# Patient Record
Sex: MALE | Race: BLACK OR AFRICAN AMERICAN | NOT HISPANIC OR LATINO
[De-identification: names, ages, dates, MRNs, and addresses within clinical notes are randomized per-mention and may not be internally consistent; named-entity substitution may affect disease eponyms.]

---

## 2022-10-12 ENCOUNTER — APPOINTMENT (OUTPATIENT)
Dept: SURGERY | Facility: CLINIC | Age: 52
End: 2022-10-12

## 2022-10-12 VITALS
DIASTOLIC BLOOD PRESSURE: 78 MMHG | SYSTOLIC BLOOD PRESSURE: 123 MMHG | HEIGHT: 71 IN | HEART RATE: 84 BPM | BODY MASS INDEX: 28.56 KG/M2 | RESPIRATION RATE: 14 BRPM | OXYGEN SATURATION: 95 % | TEMPERATURE: 97.2 F | WEIGHT: 204 LBS

## 2022-10-12 DIAGNOSIS — Z78.9 OTHER SPECIFIED HEALTH STATUS: ICD-10-CM

## 2022-10-12 DIAGNOSIS — R10.9 UNSPECIFIED ABDOMINAL PAIN: ICD-10-CM

## 2022-10-12 DIAGNOSIS — K43.9 VENTRAL HERNIA W/OUT OBSTRUCTION OR GANGRENE: ICD-10-CM

## 2022-10-12 PROBLEM — Z00.00 ENCOUNTER FOR PREVENTIVE HEALTH EXAMINATION: Status: ACTIVE | Noted: 2022-10-12

## 2022-10-12 PROCEDURE — 99203 OFFICE O/P NEW LOW 30 MIN: CPT

## 2022-10-12 NOTE — ASSESSMENT
[FreeTextEntry1] : The patient is a 51 year old male with an abdominal wall hernia and intermittent abdominal pain. The patient has been advised he will benefit from an abd/pel CT scan to further assess the hernia and the source of the pain. He will follow up upon completion for further recommendations.  A total of 40 minutes was spent coordinating the care of the patient.

## 2022-10-12 NOTE — PHYSICAL EXAM
[JVD] : no jugular venous distention  [Purpura] : no purpura  [Petechiae] : no petechiae [Skin Ulcer] : no ulcer [Skin Induration] : no induration [Alert] : alert [Oriented to Person] : oriented to person [Oriented to Place] : oriented to place [Oriented to Time] : oriented to time [Calm] : calm [de-identified] : non toxic, in no acute distress  [de-identified] : NC/AT PERRL EOMI no scleral icterus  [de-identified] : trachea midline, no gross mass  [de-identified] : no audible wheezing or stridor  [de-identified] : mildly obese soft, no localizing tenderness, no guarding, no rebound, no masses  [de-identified] : FROM of all extremities with no gross deformity or angulation, there is irreducible hernia abve the umbilicus, the defect is obscured and not discernible if it arises from the umbilicus or higher   [de-identified] : mood is calm

## 2022-10-12 NOTE — HISTORY OF PRESENT ILLNESS
[de-identified] : The patient comes to the office in consultation by Dr. Rosa Recinos for evaluation of a a lump of the abdominal wall with associated abdominal discomfort.  The patient is without nausea or vomit.  He states he intermittently will have abdominal discomfort.  The patient reports that when he was younger he had an umbilical hernia that as he got older closed. He reports that over that past several years he has developed swelling in the area above the umbilicus with associated discomfort.

## 2022-10-12 NOTE — CONSULT LETTER
[Dear  ___] : Dear  [unfilled], [Consult Letter:] : I had the pleasure of evaluating your patient, [unfilled]. [Please see my note below.] : Please see my note below. [Consult Closing:] : Thank you very much for allowing me to participate in the care of this patient.  If you have any questions, please do not hesitate to contact me. [Sincerely,] : Sincerely, [FreeTextEntry3] : Nick Jordan MD, FACS\par  \par Department of Surgery\par [facility]\par [facility]\par

## 2022-10-21 ENCOUNTER — APPOINTMENT (OUTPATIENT)
Dept: CT IMAGING | Facility: CLINIC | Age: 52
End: 2022-10-21

## 2022-10-21 ENCOUNTER — OUTPATIENT (OUTPATIENT)
Dept: OUTPATIENT SERVICES | Facility: HOSPITAL | Age: 52
LOS: 1 days | End: 2022-10-21
Payer: COMMERCIAL

## 2022-10-21 DIAGNOSIS — K43.9 VENTRAL HERNIA WITHOUT OBSTRUCTION OR GANGRENE: ICD-10-CM

## 2022-10-21 PROCEDURE — 74177 CT ABD & PELVIS W/CONTRAST: CPT | Mod: 26

## 2022-10-21 PROCEDURE — 74177 CT ABD & PELVIS W/CONTRAST: CPT

## 2022-11-09 ENCOUNTER — APPOINTMENT (OUTPATIENT)
Dept: SURGERY | Facility: CLINIC | Age: 52
End: 2022-11-09

## 2022-11-09 VITALS
OXYGEN SATURATION: 98 % | HEIGHT: 71 IN | DIASTOLIC BLOOD PRESSURE: 78 MMHG | RESPIRATION RATE: 16 BRPM | BODY MASS INDEX: 29.12 KG/M2 | SYSTOLIC BLOOD PRESSURE: 123 MMHG | TEMPERATURE: 96.5 F | WEIGHT: 208 LBS | HEART RATE: 76 BPM

## 2022-11-09 PROCEDURE — 99214 OFFICE O/P EST MOD 30 MIN: CPT

## 2022-11-09 NOTE — DATA REVIEWED
[FreeTextEntry1] : Independent review of the abd/pel CT scan reveals a 1.2 cm supraumbilical ventral hernia and a 1.5 cm umbilical hernia, no bowel involvement and no bowel obstruction.

## 2022-11-09 NOTE — PHYSICAL EXAM
[Alert] : alert [Oriented to Person] : oriented to person [Oriented to Place] : oriented to place [Oriented to Time] : oriented to time [Calm] : calm [JVD] : no jugular venous distention  [Purpura] : no purpura  [Petechiae] : no petechiae [Skin Ulcer] : no ulcer [Skin Induration] : no induration [de-identified] : non toxic, in no acute distress  [de-identified] : NC/AT PERRL EOMI no scleral icterus  [de-identified] : trachea midline, no gross mass  [de-identified] : no audible wheezing or stridor  [de-identified] : mildly obese soft, no localizing tenderness, no guarding, no rebound, no masses  [de-identified] : FROM of all extremities with no gross deformity or angulation, there remains an  irreducible hernia abve the umbilicus, the defect is obscured and not discernible if it arises from the umbilicus or higher   [de-identified] : mood is calm

## 2022-11-09 NOTE — ASSESSMENT
[FreeTextEntry1] : The patient is a 51 year old male with a ventral and umbilical hernia.   The patient has been advised that he will benefit from weight loss prior to repair.  The patient has been advised that weight loss will be an important adjunct to help potentially reduce the risks of infection, hernia recurrence, and chronic post operative pain associated with surgery by potentially reducing the tension along the abdominal wall.  The risks, benefits, and alternatives including the option of doing nothing to a robotic, possible laparoscopic, and possible open ventral and umbilical hernia repair with  mesh were discussed.  The potential complications including but not limited to infection, bleeding, hernia recurrence, chronic post-operative pain, and seroma formation were discussed.  The patient was educated regarding the signs and symptoms of hernia strangulation and advised to seek immediate MD evaluation should this occur.  The patient understands and will embark on a weight loss routine.  He will follow up in 3 months or sooner should any problems or issues arise. A total of 30 minutes was spent coordinating the patient's care.

## 2022-11-09 NOTE — HISTORY OF PRESENT ILLNESS
[de-identified] : The patient returns to the office with no complaints. The patient is without abdominal pain, nausea, or vomit. He states that the lump has not changed.

## 2023-01-11 ENCOUNTER — APPOINTMENT (OUTPATIENT)
Dept: SURGERY | Facility: CLINIC | Age: 53
End: 2023-01-11
Payer: COMMERCIAL

## 2023-01-11 VITALS
SYSTOLIC BLOOD PRESSURE: 122 MMHG | RESPIRATION RATE: 16 BRPM | TEMPERATURE: 97.2 F | WEIGHT: 207 LBS | HEIGHT: 71 IN | OXYGEN SATURATION: 97 % | DIASTOLIC BLOOD PRESSURE: 77 MMHG | HEART RATE: 80 BPM | BODY MASS INDEX: 28.98 KG/M2

## 2023-01-11 PROCEDURE — 99214 OFFICE O/P EST MOD 30 MIN: CPT

## 2023-01-11 NOTE — HISTORY OF PRESENT ILLNESS
[de-identified] : The patient returns to the office with no abdominal pain, nausea, or vomit.  He states that he was trying to do sit ups and felt more pain and swelling in the hernia site. He rested overnight and states that the pain went away and the swelling improved. He is down one pound since his last visit.

## 2023-01-11 NOTE — ASSESSMENT
[FreeTextEntry1] : The patient is a 52 year old male with a ventral and umbilical hernia.  There has been no change in the hernias.  The patient has again been advised to lose weight in preparation for hernia repair.  The patient has been advised that weight loss will be an important adjunct to help potentially reduce the risks of infection, hernia recurrence, and chronic post operative pain associated with surgery by potentially reducing the tension along the abdominal wall.  The patient will embark on weight loss and follow up in 8 weeks or sooner should any issues or problems arise.  A total of 30 minutes was spent coordinating the care of the patient.

## 2023-03-08 ENCOUNTER — APPOINTMENT (OUTPATIENT)
Dept: SURGERY | Facility: CLINIC | Age: 53
End: 2023-03-08

## 2023-03-13 ENCOUNTER — APPOINTMENT (OUTPATIENT)
Dept: SURGERY | Facility: CLINIC | Age: 53
End: 2023-03-13
Payer: COMMERCIAL

## 2023-03-13 VITALS
RESPIRATION RATE: 16 BRPM | HEIGHT: 71 IN | BODY MASS INDEX: 27.58 KG/M2 | DIASTOLIC BLOOD PRESSURE: 67 MMHG | TEMPERATURE: 98.1 F | SYSTOLIC BLOOD PRESSURE: 109 MMHG | HEART RATE: 69 BPM | OXYGEN SATURATION: 95 % | WEIGHT: 197 LBS

## 2023-03-13 PROCEDURE — 99213 OFFICE O/P EST LOW 20 MIN: CPT

## 2023-03-13 NOTE — PHYSICAL EXAM
[JVD] : no jugular venous distention  [Purpura] : no purpura  [Petechiae] : no petechiae [Skin Ulcer] : no ulcer [Skin Induration] : no induration [Alert] : alert [Oriented to Person] : oriented to person [Oriented to Place] : oriented to place [Oriented to Time] : oriented to time [Calm] : calm [de-identified] : non toxic, in no acute distress  [de-identified] : NC/AT PERRL EOMI no scleral icterus  [de-identified] : trachea midline, no gross mass  [de-identified] : no audible wheezing or stridor  [de-identified] : mildly obese soft, no localizing tenderness, no guarding, no rebound, no masses  [de-identified] : FROM of all extremities with no gross deformity or angulation, there remains an  irreducible hernia above the umbilicus, the defect remains obscured and not discernible if it arises from the umbilicus or higher   [de-identified] : mood is calm

## 2023-03-13 NOTE — HISTORY OF PRESENT ILLNESS
[de-identified] : The patient returns to the office with less discomfort as he has lost a few more pounds. He has no abdominal pain, nausea, or vomit at present.

## 2023-03-13 NOTE — ASSESSMENT
[FreeTextEntry1] : The patient is a 52 year old male with stable and asymptomatic ventral and umbilical hernias.  The patient at this point is going to continue efforts at weight loss and will follow up in 3 months or sooner should any problems or issues arise. A  total of 20 minutes was spent coordinating the patient's care.

## 2023-07-10 ENCOUNTER — APPOINTMENT (OUTPATIENT)
Dept: SURGERY | Facility: CLINIC | Age: 53
End: 2023-07-10
Payer: COMMERCIAL

## 2023-07-10 VITALS
TEMPERATURE: 97.7 F | HEIGHT: 71 IN | HEART RATE: 66 BPM | OXYGEN SATURATION: 98 % | WEIGHT: 191 LBS | DIASTOLIC BLOOD PRESSURE: 70 MMHG | BODY MASS INDEX: 26.74 KG/M2 | RESPIRATION RATE: 16 BRPM | SYSTOLIC BLOOD PRESSURE: 112 MMHG

## 2023-07-10 PROCEDURE — 99213 OFFICE O/P EST LOW 20 MIN: CPT

## 2023-07-10 NOTE — ASSESSMENT
[FreeTextEntry1] : The patient is a 52 year old male with stable ventral and umbilical hernias.  The patient has been advised to continue efforts at weight loss.  He will follow up in 8 weeks for reevaluation.  The patient has been advised that weight loss will be an important adjunct to help potentially reduce the risks of infection, hernia recurrence, and chronic post operative pain associated with surgery by potentially reducing the tension along the abdominal wall. A total of 20 minutes was spent coordinating the patient's care.

## 2023-07-10 NOTE — PHYSICAL EXAM
[JVD] : no jugular venous distention  [Purpura] : no purpura  [Petechiae] : no petechiae [Skin Ulcer] : no ulcer [Skin Induration] : no induration [Alert] : alert [Oriented to Person] : oriented to person [Oriented to Place] : oriented to place [Oriented to Time] : oriented to time [Calm] : calm [de-identified] : non toxic, in no acute distress  [de-identified] : NC/AT PERRL EOMI no scleral icterus  [de-identified] : trachea midline, no gross mass  [de-identified] : no audible wheezing or stridor  [de-identified] : remains mildly obese soft, no localizing tenderness, no guarding, no rebound, no masses  [de-identified] : FROM of all extremities with no gross deformity or angulation, there remains an  irreducible hernia above the umbilicus, the defect remains obscured and not discernible if it arises from the umbilicus or higher   [de-identified] : mood is calm

## 2023-07-10 NOTE — HISTORY OF PRESENT ILLNESS
[de-identified] : The patient returns to the office with no abdominal pain and no pain at the hernia sites.  He has lost 4 pounds since his last visit and states he is feeling better since.

## 2023-09-06 ENCOUNTER — APPOINTMENT (OUTPATIENT)
Dept: SURGERY | Facility: CLINIC | Age: 53
End: 2023-09-06
Payer: COMMERCIAL

## 2023-09-06 VITALS
BODY MASS INDEX: 28 KG/M2 | SYSTOLIC BLOOD PRESSURE: 137 MMHG | HEART RATE: 77 BPM | OXYGEN SATURATION: 96 % | RESPIRATION RATE: 16 BRPM | HEIGHT: 71 IN | WEIGHT: 200 LBS | DIASTOLIC BLOOD PRESSURE: 78 MMHG | TEMPERATURE: 98.2 F

## 2023-09-06 DIAGNOSIS — K43.9 VENTRAL HERNIA W/OUT OBSTRUCTION OR GANGRENE: ICD-10-CM

## 2023-09-06 DIAGNOSIS — K42.9 UMBILICAL HERNIA W/OUT OBSTRUCTION OR GANGRENE: ICD-10-CM

## 2023-09-06 PROCEDURE — 99213 OFFICE O/P EST LOW 20 MIN: CPT

## 2023-09-06 NOTE — ASSESSMENT
[FreeTextEntry1] : The patient is a 52-year-old male with stable ventral and umbilical hernias.  The patient has been advised about weight loss.  The patient has been advised that weight loss will be an important adjunct to help potentially reduce the risks of infection, hernia recurrence, and chronic post operative pain associated with surgery by potentially reducing the tension along the abdominal wall. He will focus on weight loss and will follow up in 3 months or sooner should any problems arise.  A total of 20 minutes was spent coordinating the patient's care.

## 2023-09-06 NOTE — PHYSICAL EXAM
[JVD] : no jugular venous distention  [Purpura] : no purpura  [Petechiae] : no petechiae [Skin Ulcer] : no ulcer [Skin Induration] : no induration [Alert] : alert [Oriented to Person] : oriented to person [Oriented to Place] : oriented to place [Oriented to Time] : oriented to time [Calm] : calm [de-identified] : non toxic, in no acute distress  [de-identified] : NC/AT PERRL EOMI no scleral icterus  [de-identified] : trachea midline, no gross mass  [de-identified] : no audible wheezing or stridor  [de-identified] : remains mildly obese soft, no localizing tenderness, no guarding, no rebound, no masses  [de-identified] : FROM of all extremities with no gross deformity or angulation, there remains an  irreducible hernia above the umbilicus, the defect remains obscured and not discernible if it arises from the umbilicus or higher   [de-identified] : mood is calm

## 2023-09-06 NOTE — HISTORY OF PRESENT ILLNESS
[de-identified] : The patient returns to the office with an almost 10 pound weight gain. He has no pain and no changes in the hernias.  He has no nausea or vomit.

## 2023-12-13 ENCOUNTER — APPOINTMENT (OUTPATIENT)
Dept: SURGERY | Facility: CLINIC | Age: 53
End: 2023-12-13